# Patient Record
Sex: MALE | Race: BLACK OR AFRICAN AMERICAN | Employment: FULL TIME | URBAN - METROPOLITAN AREA
[De-identification: names, ages, dates, MRNs, and addresses within clinical notes are randomized per-mention and may not be internally consistent; named-entity substitution may affect disease eponyms.]

---

## 2023-02-01 ENCOUNTER — HOSPITAL ENCOUNTER (EMERGENCY)
Age: 58
Discharge: HOME | End: 2023-02-01
Attending: EMERGENCY MEDICINE
Payer: COMMERCIAL

## 2023-02-01 VITALS
HEART RATE: 80 BPM | WEIGHT: 188 LBS | OXYGEN SATURATION: 96 % | RESPIRATION RATE: 16 BRPM | HEIGHT: 73 IN | BODY MASS INDEX: 24.92 KG/M2 | SYSTOLIC BLOOD PRESSURE: 128 MMHG | TEMPERATURE: 97.5 F | DIASTOLIC BLOOD PRESSURE: 83 MMHG

## 2023-02-01 DIAGNOSIS — G47.00 INSOMNIA, UNSPECIFIED TYPE: Primary | ICD-10-CM

## 2023-02-01 LAB
A/G RATIO: 1.6 (ref 1.1–2.2)
ALBUMIN SERPL-MCNC: 4.2 G/DL (ref 3.4–5)
ALP BLD-CCNC: 63 U/L (ref 40–129)
ALT SERPL-CCNC: 20 U/L (ref 10–40)
ANION GAP SERPL CALCULATED.3IONS-SCNC: 10 MMOL/L (ref 3–16)
AST SERPL-CCNC: 18 U/L (ref 15–37)
BASOPHILS ABSOLUTE: 0 K/UL (ref 0–0.2)
BASOPHILS RELATIVE PERCENT: 0.7 %
BILIRUB SERPL-MCNC: 1.2 MG/DL (ref 0–1)
BUN BLDV-MCNC: 11 MG/DL (ref 7–20)
CALCIUM SERPL-MCNC: 9.2 MG/DL (ref 8.3–10.6)
CHLORIDE BLD-SCNC: 101 MMOL/L (ref 99–110)
CO2: 26 MMOL/L (ref 21–32)
CREAT SERPL-MCNC: 0.9 MG/DL (ref 0.9–1.3)
EKG ATRIAL RATE: 71 BPM
EKG DIAGNOSIS: NORMAL
EKG P AXIS: 37 DEGREES
EKG P-R INTERVAL: 182 MS
EKG Q-T INTERVAL: 362 MS
EKG QRS DURATION: 86 MS
EKG QTC CALCULATION (BAZETT): 393 MS
EKG R AXIS: 2 DEGREES
EKG T AXIS: 17 DEGREES
EKG VENTRICULAR RATE: 71 BPM
EOSINOPHILS ABSOLUTE: 0.1 K/UL (ref 0–0.6)
EOSINOPHILS RELATIVE PERCENT: 0.9 %
GFR SERPL CREATININE-BSD FRML MDRD: >60 ML/MIN/{1.73_M2}
GLUCOSE BLD-MCNC: 211 MG/DL (ref 70–99)
HCT VFR BLD CALC: 35.7 % (ref 40.5–52.5)
HEMOGLOBIN: 12 G/DL (ref 13.5–17.5)
LYMPHOCYTES ABSOLUTE: 1.3 K/UL (ref 1–5.1)
LYMPHOCYTES RELATIVE PERCENT: 21.3 %
MCH RBC QN AUTO: 27.7 PG (ref 26–34)
MCHC RBC AUTO-ENTMCNC: 33.5 G/DL (ref 31–36)
MCV RBC AUTO: 82.8 FL (ref 80–100)
MONOCYTES ABSOLUTE: 0.3 K/UL (ref 0–1.3)
MONOCYTES RELATIVE PERCENT: 5.6 %
NEUTROPHILS ABSOLUTE: 4.2 K/UL (ref 1.7–7.7)
NEUTROPHILS RELATIVE PERCENT: 71.5 %
PDW BLD-RTO: 13.4 % (ref 12.4–15.4)
PLATELET # BLD: 215 K/UL (ref 135–450)
PMV BLD AUTO: 7.9 FL (ref 5–10.5)
POTASSIUM REFLEX MAGNESIUM: 3.9 MMOL/L (ref 3.5–5.1)
RBC # BLD: 4.31 M/UL (ref 4.2–5.9)
SODIUM BLD-SCNC: 137 MMOL/L (ref 136–145)
TOTAL PROTEIN: 6.8 G/DL (ref 6.4–8.2)
TSH REFLEX: 0.72 UIU/ML (ref 0.27–4.2)
VITAMIN D 25-HYDROXY: 31.3 NG/ML
WBC # BLD: 5.9 K/UL (ref 4–11)

## 2023-02-01 PROCEDURE — 84443 ASSAY THYROID STIM HORMONE: CPT

## 2023-02-01 PROCEDURE — 80053 COMPREHEN METABOLIC PANEL: CPT

## 2023-02-01 PROCEDURE — 93005 ELECTROCARDIOGRAM TRACING: CPT | Performed by: EMERGENCY MEDICINE

## 2023-02-01 PROCEDURE — 85025 COMPLETE CBC W/AUTO DIFF WBC: CPT

## 2023-02-01 PROCEDURE — 99284 EMERGENCY DEPT VISIT MOD MDM: CPT

## 2023-02-01 PROCEDURE — 82306 VITAMIN D 25 HYDROXY: CPT

## 2023-02-01 RX ORDER — DORZOLAMIDE HCL 20 MG/ML
2 SOLUTION/ DROPS OPHTHALMIC 3 TIMES DAILY
COMMUNITY

## 2023-02-01 RX ORDER — TIMOLOL MALEATE 5 MG/ML
1 SOLUTION/ DROPS OPHTHALMIC 2 TIMES DAILY
COMMUNITY

## 2023-02-01 RX ORDER — HYDROXYZINE HYDROCHLORIDE 25 MG/1
25 TABLET, FILM COATED ORAL
Qty: 20 TABLET | Refills: 0 | Status: SHIPPED | OUTPATIENT
Start: 2023-02-01

## 2023-02-01 RX ORDER — BRIMONIDINE TARTRATE 2 MG/ML
1 SOLUTION/ DROPS OPHTHALMIC 3 TIMES DAILY
COMMUNITY

## 2023-02-01 RX ORDER — LISINOPRIL 10 MG/1
10 TABLET ORAL DAILY
COMMUNITY

## 2023-02-01 RX ORDER — AMLODIPINE BESYLATE 10 MG/1
10 TABLET ORAL DAILY
COMMUNITY

## 2023-02-01 RX ORDER — DOXAZOSIN 2 MG/1
2 TABLET ORAL NIGHTLY
COMMUNITY

## 2023-02-01 ASSESSMENT — ENCOUNTER SYMPTOMS
NAUSEA: 0
SHORTNESS OF BREATH: 0
ABDOMINAL PAIN: 0
DIARRHEA: 0
VOMITING: 0
COUGH: 0

## 2023-02-01 ASSESSMENT — LIFESTYLE VARIABLES
HOW MANY STANDARD DRINKS CONTAINING ALCOHOL DO YOU HAVE ON A TYPICAL DAY: PATIENT DOES NOT DRINK
HOW OFTEN DO YOU HAVE A DRINK CONTAINING ALCOHOL: NEVER

## 2023-02-01 NOTE — ED PROVIDER NOTES
[address] [facility]          ATTENDING PHYSICIAN NOTE       Date of evaluation: 2/1/2023    Chief Complaint     Insomnia (Pt is stating he has not been able to sleep well for 2 weeks now. Denies any new stressors or recent new medications.)      History of Present Illness     Rosa Zuniga is a 59 y.o. male who presents with a 2-week history of insomnia. The patient has tried multiple different over-the-counter medications including melatonin but has been able to get no more than 1 to 2 hours of sleep per night since symptoms began. He does report that he worries about things when he wakes up that there could be something more serious wrong with him. He has never had insomnia before. He denies any physical pain. About 3 months ago, he was diagnosed with hypertension after never having been on medications and had not been to a doctor in many years. He currently does not have a primary care physician. The patient also was recently diagnosed with glaucoma and started on 3 eyedrops. This was done a couple of weeks before he began experiencing the insomnia. The patient states that his glaucoma symptoms are improving. The patient does feel his mind racing and feels anxiety about what could be going on and this may compound things. He tries to listen to music to try to go to sleep. He does not report symptoms consistent with sleep apnea. Overall however he does feel some generalized fatigue. Review of Systems     Review of Systems   Constitutional:  Positive for appetite change and fatigue. Negative for chills and fever. Eyes:  Positive for visual disturbance (from glaucoma). Respiratory:  Negative for cough and shortness of breath. Cardiovascular:  Negative for chest pain. Gastrointestinal:  Negative for abdominal pain, diarrhea, nausea and vomiting. Neurological:  Negative for headaches. All other systems reviewed and are negative.     Past Medical, Surgical, Family, and Social History     He has a past medical history of Glaucoma and Hypertension. He has no past surgical history on file. His family history is not on file. He reports that he does not currently use alcohol. He reports that he does not currently use drugs. Medications     Discharge Medication List as of 2/1/2023  9:33 AM        CONTINUE these medications which have NOT CHANGED    Details   lisinopril (PRINIVIL;ZESTRIL) 10 MG tablet Take 10 mg by mouth dailyHistorical Med      amLODIPine (NORVASC) 10 MG tablet Take 10 mg by mouth dailyHistorical Med      doxazosin (CARDURA) 2 MG tablet Take 2 mg by mouth nightlyHistorical Med      timolol (TIMOPTIC) 0.5 % ophthalmic solution Place 1 drop into both eyes 2 times dailyHistorical Med      brimonidine (ALPHAGAN) 0.2 % ophthalmic solution Place 1 drop into the left eye 3 times dailyHistorical Med      dorzolamide (TRUSOPT) 2 % ophthalmic solution Place 2 drops into the left eye 3 times dailyHistorical Med             Allergies     He has No Known Allergies. Physical Exam     INITIAL VITALS: BP: (!) 176/103, Temp: 98.7 °F (37.1 °C), Heart Rate: 78, Resp: 18, SpO2: 99 %   Physical Exam  Vitals and nursing note reviewed. Constitutional:       General: He is not in acute distress. Appearance: He is well-developed. He is not diaphoretic. Eyes:      Extraocular Movements: Extraocular movements intact. Conjunctiva/sclera:      Left eye: Left conjunctiva is injected. Pupils: Pupils are equal, round, and reactive to light. Cardiovascular:      Rate and Rhythm: Normal rate and regular rhythm. Pulmonary:      Effort: Pulmonary effort is normal.      Breath sounds: Normal breath sounds. Abdominal:      General: Bowel sounds are normal. There is no distension. Palpations: Abdomen is soft. Tenderness: There is no abdominal tenderness. Skin:     General: Skin is warm and dry.    Neurological:      Mental Status: He is alert and oriented to person, place, and time.   Psychiatric:         Behavior: Behavior normal.       Diagnostic Results     EKG   Interpreted by No att. providers found     Rhythm: normal sinus   Rate: normal  Axis: normal  Ectopy: none  Conduction: normal  ST Segments: no acute change  T Waves: no acute change  Q Waves: none    Clinical Impression: normal sinus rhythm with LVH      RADIOLOGY:  No orders to display       LABS:   Results for orders placed or performed during the hospital encounter of 02/01/23   CMP w/ Reflex to MG   Result Value Ref Range    Sodium 137 136 - 145 mmol/L    Potassium reflex Magnesium 3.9 3.5 - 5.1 mmol/L    Chloride 101 99 - 110 mmol/L    CO2 26 21 - 32 mmol/L    Anion Gap 10 3 - 16    Glucose 211 (H) 70 - 99 mg/dL    BUN 11 7 - 20 mg/dL    Creatinine 0.9 0.9 - 1.3 mg/dL    Est, Glom Filt Rate >60 >60    Calcium 9.2 8.3 - 10.6 mg/dL    Total Protein 6.8 6.4 - 8.2 g/dL    Albumin 4.2 3.4 - 5.0 g/dL    Albumin/Globulin Ratio 1.6 1.1 - 2.2    Total Bilirubin 1.2 (H) 0.0 - 1.0 mg/dL    Alkaline Phosphatase 63 40 - 129 U/L    ALT 20 10 - 40 U/L    AST 18 15 - 37 U/L   CBC with Auto Differential   Result Value Ref Range    WBC 5.9 4.0 - 11.0 K/uL    RBC 4.31 4.20 - 5.90 M/uL    Hemoglobin 12.0 (L) 13.5 - 17.5 g/dL    Hematocrit 35.7 (L) 40.5 - 52.5 %    MCV 82.8 80.0 - 100.0 fL    MCH 27.7 26.0 - 34.0 pg    MCHC 33.5 31.0 - 36.0 g/dL    RDW 13.4 12.4 - 15.4 %    Platelets 215 135 - 450 K/uL    MPV 7.9 5.0 - 10.5 fL    Neutrophils % 71.5 %    Lymphocytes % 21.3 %    Monocytes % 5.6 %    Eosinophils % 0.9 %    Basophils % 0.7 %    Neutrophils Absolute 4.2 1.7 - 7.7 K/uL    Lymphocytes Absolute 1.3 1.0 - 5.1 K/uL    Monocytes Absolute 0.3 0.0 - 1.3 K/uL    Eosinophils Absolute 0.1 0.0 - 0.6 K/uL    Basophils Absolute 0.0 0.0 - 0.2 K/uL   EKG 12 Lead   Result Value Ref Range    Ventricular Rate 71 BPM    Atrial Rate 71 BPM    P-R Interval 182 ms    QRS Duration 86 ms    Q-T Interval 362 ms    QTc  Calculation (Bazetarun) 393 ms    P Axis 37 degrees    R Axis 2 degrees    T Axis 17 degrees    Diagnosis       EKG performed in ER and to be interpreted by ER physician. Confirmed by MD, ER ([id number]),  [address], [address] ([id number]) on 2/1/2023 8:23:01 AM       ED BEDSIDE ULTRASOUND:  No results found. RECENT VITALS:  BP: 128/83,Temp: 97.5 °F (36.4 °C), Heart Rate: 80, Resp: 16, SpO2: 96 %     ED Course     Nursing Notes, Past Medical Hx, Past Surgical Hx, Social Hx,Allergies, and Family Hx were reviewed. patient was given the following medications:  Orders Placed This Encounter   Medications    hydrOXYzine HCl (ATARAX) 25 MG tablet     Sig: Take 1 tablet by mouth nightly as needed (insomnia)     Dispense:  20 tablet     Refill:  0         CONSULTS:  None    MEDICAL DECISIONMAKING / ASSESSMENT / Radha Gonzalez is a 59 y.o. male presenting with insomnia of 2 weeks duration spite taking several over-the-counter medications and not having them work. His medications were reviewed and of note was that the doxazosin is recommended to be taken nightly but he is taking it in the morning. Given that this patient has hypertension and has not been to a doctor, I did offer some baseline labs that could be followed up by primary care as well and we will plug him into our resident follow-up clinic. Screening labs here were unremarkable. EKG shows signs of LVH but no other concerning findings and patient was felt to be stable for discharge home to follow-up in the medicine clinic since he has no established primary care. I will trial him on hydroxyzine as needed for sleep at night and advised that he set up primary care given his history of hypertension and now glaucoma. He is agreeable to this plan. Information on insomnia was provided to the patient. Clinical Impression     1.  Insomnia, unspecified type        Disposition     PATIENT REFERRED TO:  Jesus [address]  Anjum [address] Re[address] Ozuna [address] [place] [id number]  [phone]    Call   to schedule new primary care followup    DISCHARGE MEDICATIONS:  Discharge Medication List as of 2/1/2023  9:33 AM        START taking these medications    Details   hydrOXYzine HCl (ATARAX) 25 MG tablet Take 1 tablet by mouth nightly as needed (insomnia), Disp-20 tablet, R-0Print             DISPOSITION Decision To Discharge 02/01/2023 09:24:32 AM         Jordi Main MD  02/01/23 1024

## 2023-02-07 ENCOUNTER — OFFICE VISIT (OUTPATIENT)
Dept: INTERNAL MEDICINE CLINIC | Age: 58
End: 2023-02-07
Payer: COMMERCIAL

## 2023-02-07 VITALS
WEIGHT: 194.1 LBS | RESPIRATION RATE: 16 BRPM | TEMPERATURE: 98.8 F | HEART RATE: 78 BPM | DIASTOLIC BLOOD PRESSURE: 78 MMHG | BODY MASS INDEX: 25.61 KG/M2 | OXYGEN SATURATION: 98 % | SYSTOLIC BLOOD PRESSURE: 115 MMHG

## 2023-02-07 DIAGNOSIS — H40.9 GLAUCOMA OF LEFT EYE, UNSPECIFIED GLAUCOMA TYPE: ICD-10-CM

## 2023-02-07 DIAGNOSIS — R73.9 HYPERGLYCEMIA: Primary | ICD-10-CM

## 2023-02-07 DIAGNOSIS — I10 ESSENTIAL HYPERTENSION: ICD-10-CM

## 2023-02-07 LAB — HBA1C MFR BLD: 5.6 %

## 2023-02-07 PROCEDURE — 99213 OFFICE O/P EST LOW 20 MIN: CPT | Performed by: STUDENT IN AN ORGANIZED HEALTH CARE EDUCATION/TRAINING PROGRAM

## 2023-02-07 PROCEDURE — 83036 HEMOGLOBIN GLYCOSYLATED A1C: CPT | Performed by: STUDENT IN AN ORGANIZED HEALTH CARE EDUCATION/TRAINING PROGRAM

## 2023-02-07 ASSESSMENT — ENCOUNTER SYMPTOMS
PHOTOPHOBIA: 0
NAUSEA: 0
DIARRHEA: 0
VOMITING: 0
SHORTNESS OF BREATH: 0
WHEEZING: 0
BACK PAIN: 0
ABDOMINAL PAIN: 0
CONSTIPATION: 0
CHOKING: 0
COUGH: 0

## 2023-02-07 NOTE — PATIENT INSTRUCTIONS
Please come back in 3 months. Please check your blood pressure at home and bring the blood pressure log to our next appointment.

## 2023-04-28 SDOH — ECONOMIC STABILITY: INCOME INSECURITY: HOW HARD IS IT FOR YOU TO PAY FOR THE VERY BASICS LIKE FOOD, HOUSING, MEDICAL CARE, AND HEATING?: HARD

## 2023-04-28 SDOH — ECONOMIC STABILITY: TRANSPORTATION INSECURITY
IN THE PAST 12 MONTHS, HAS LACK OF TRANSPORTATION KEPT YOU FROM MEETINGS, WORK, OR FROM GETTING THINGS NEEDED FOR DAILY LIVING?: NO

## 2023-04-28 SDOH — ECONOMIC STABILITY: FOOD INSECURITY: WITHIN THE PAST 12 MONTHS, THE FOOD YOU BOUGHT JUST DIDN'T LAST AND YOU DIDN'T HAVE MONEY TO GET MORE.: OFTEN TRUE

## 2023-04-28 SDOH — ECONOMIC STABILITY: HOUSING INSECURITY
IN THE LAST 12 MONTHS, WAS THERE A TIME WHEN YOU DID NOT HAVE A STEADY PLACE TO SLEEP OR SLEPT IN A SHELTER (INCLUDING NOW)?: NO

## 2023-04-28 SDOH — ECONOMIC STABILITY: FOOD INSECURITY: WITHIN THE PAST 12 MONTHS, YOU WORRIED THAT YOUR FOOD WOULD RUN OUT BEFORE YOU GOT MONEY TO BUY MORE.: SOMETIMES TRUE

## 2023-05-01 ENCOUNTER — OFFICE VISIT (OUTPATIENT)
Dept: INTERNAL MEDICINE CLINIC | Age: 58
End: 2023-05-01
Payer: COMMERCIAL

## 2023-05-01 VITALS
SYSTOLIC BLOOD PRESSURE: 153 MMHG | TEMPERATURE: 97.3 F | BODY MASS INDEX: 24.04 KG/M2 | OXYGEN SATURATION: 97 % | WEIGHT: 181.4 LBS | DIASTOLIC BLOOD PRESSURE: 89 MMHG | HEIGHT: 73 IN | RESPIRATION RATE: 20 BRPM | HEART RATE: 96 BPM

## 2023-05-01 DIAGNOSIS — L97.419 ULCER OF RIGHT HEEL AND MIDFOOT, UNSPECIFIED ULCER STAGE (HCC): Primary | ICD-10-CM

## 2023-05-01 DIAGNOSIS — I10 PRIMARY HYPERTENSION: ICD-10-CM

## 2023-05-01 PROCEDURE — 99213 OFFICE O/P EST LOW 20 MIN: CPT

## 2023-05-01 NOTE — PATIENT INSTRUCTIONS
- Start applying antibiotic cream over lesion.  - Please see Podiatry for visit at given appointment time. - Continue current meds. - Return to clinic in 2 weeks for BP follow up. GO TO EMERGENCY NOW TO SEE PODIATRY RESIDENTS  Patient did not want to use ED due to large amt out of pocket expense.    Zay Waite will see patient tomorrow in his office in [facility] DR FIORELLA PEDRAZA at [address] [place] [address] and [address]@ 11:00am

## 2023-05-01 NOTE — ASSESSMENT & PLAN NOTE
High today however component of pain with skin lesion. Recheck at next visit. Continue current medications.

## 2023-05-01 NOTE — PROGRESS NOTES
[facility]    Carlton Levin is a 59 y.o. male, here for evaluation of the following concerns:    Right Ankle Heel Swelling/Drainage  Patient states painful swelling and discharge of back of R heel for the past 2-3 weeks. He states that it is draining pus from multiple small wounds. He is not sure what is causing it, but states he has had it happen a few years ago. He did not use medications for it at that time, it healed without intervention in a few months. With this current episode, he endorses it is far worse. He endorses associated feeling febrile for the past few days, off and on, never measured with a thermometer. He denies chills. He denies any other signs of infection including cough, cold, congestion. Of note had recent EGD and colonoscopy. HTN  Has hx of HTN. BP elevated this visit 153/89. Takes 3 meds - Lisinopril 10 mg daily, Amlodipine 10 mg daily and Cardura 2 mg nightly. Review of Systems  Per HPI. MEDICATIONS:  Prior to Visit Medications    Medication Sig Taking? Authorizing Provider   linaCLOtide (LINZESS PO) Take by mouth Yes Historical Provider, MD   mupirocin (BACTROBAN) 2 % ointment Apply topically 3 times daily.  Yes Yrn Calderon MD   lisinopril (PRINIVIL;ZESTRIL) 10 MG tablet Take 1 tablet by mouth daily Yes Historical Provider, MD   amLODIPine (NORVASC) 10 MG tablet Take 1 tablet by mouth daily Yes Historical Provider, MD   doxazosin (CARDURA) 2 MG tablet Take 1 tablet by mouth nightly Yes Historical Provider, MD   timolol (TIMOPTIC) 0.5 % ophthalmic solution Place 1 drop into both eyes 2 times daily Yes Historical Provider, MD   brimonidine (ALPHAGAN) 0.2 % ophthalmic solution Place 1 drop into the left eye 3 times daily Yes Historical Provider, MD   dorzolamide (TRUSOPT) 2 % ophthalmic solution Place 2 drops into the left eye 3 times daily Yes Historical Provider, MD        Vitals:    05/01/23 1432   BP: (!) 153/89   Site:

## 2023-05-01 NOTE — ASSESSMENT & PLAN NOTE
Patient p/w blisters/ulceration of R heel with drainage and pus. I am unsure the etiology of this. 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 I do believe we should biopsy so we are able to know what is going on here and treat appropriately. In meantime, given Mupirocin ointment and instructed to use on skin TID. Plan for appointment w/ Dr. Mia Tsang tomorrow (5/2) at 11 a.m. (Patient refused ED visit d/t insurance issues).

## 2023-05-31 ENCOUNTER — OFFICE VISIT (OUTPATIENT)
Dept: INTERNAL MEDICINE CLINIC | Age: 58
End: 2023-05-31
Payer: COMMERCIAL

## 2023-05-31 VITALS
RESPIRATION RATE: 20 BRPM | OXYGEN SATURATION: 99 % | TEMPERATURE: 98.1 F | DIASTOLIC BLOOD PRESSURE: 87 MMHG | HEART RATE: 86 BPM | BODY MASS INDEX: 24.36 KG/M2 | SYSTOLIC BLOOD PRESSURE: 169 MMHG | HEIGHT: 73 IN | WEIGHT: 183.8 LBS

## 2023-05-31 DIAGNOSIS — Z12.5 PROSTATE CANCER SCREENING: ICD-10-CM

## 2023-05-31 DIAGNOSIS — L97.411 ULCER OF RIGHT HEEL AND MIDFOOT, LIMITED TO BREAKDOWN OF SKIN (HCC): ICD-10-CM

## 2023-05-31 DIAGNOSIS — D64.9 ANEMIA, UNSPECIFIED TYPE: Primary | ICD-10-CM

## 2023-05-31 DIAGNOSIS — I10 PRIMARY HYPERTENSION: ICD-10-CM

## 2023-05-31 PROCEDURE — 99213 OFFICE O/P EST LOW 20 MIN: CPT | Performed by: STUDENT IN AN ORGANIZED HEALTH CARE EDUCATION/TRAINING PROGRAM

## 2023-05-31 RX ORDER — AMLODIPINE AND OLMESARTAN MEDOXOMIL 10; 40 MG/1; MG/1
1 TABLET ORAL DAILY
Qty: 30 TABLET | Refills: 0 | Status: SHIPPED | OUTPATIENT
Start: 2023-05-31

## 2023-05-31 NOTE — ASSESSMENT & PLAN NOTE
Patient has symptoms of fatigue and weakness  Hgb 12.0 on 4/2023  OP GI colonoscopy negative for any findings  Full anemic work up

## 2023-05-31 NOTE — PATIENT INSTRUCTIONS
Today we discussed the following:    Leg wound - please follow up with Dr. Alicia Lozano for further recommendations  Anemia - please obtain labs for further anemic work up  Hypertension - please stop taking amlodipine and lisinopril and start Angela 10-40 mg capsule daily  Please keep a blood pressure log to check in the morning, afternoon and evening and bring in next visit  Basic labs - please get cholesterol and PSA labs done    It was a pleasure meeting you both, and I will see you in 2 weeks!

## 2023-05-31 NOTE — ASSESSMENT & PLAN NOTE
Symptoms improving with 7 day course of Bactrim  F/u with Dr. Radha Parr in 1 week for biopsy results

## 2023-05-31 NOTE — ASSESSMENT & PLAN NOTE
Uncontrolled with /78  Stop amlodipine and lisinopril  Start Angela 10-40 mg daily  Counseled patient to keep blood pressure log  Reassess in 2 weeks

## 2023-05-31 NOTE — PROGRESS NOTES
[facility]    Melanie Clarke is a 59 y.o. male, here for evaluation of the following concerns:    Mr. Melanie Clarke is a 39-year-old male with a history of hypertension and anemia presents to the clinic for a follow up on his right foot wound. Patient was referred to podiatry and was started on Bactrim and had moderate improvement. Patient states he still has feelings of fatigue and was found to be anemic on his prior ED visit. Patient underwent colonoscopy and was negative for any findings. Patient states he wants to know why he is anemic. He denies any hematochezia, hematuria, hematemesis, nausea, vomiting, chest pain, shortness of breath, fever, chills, cough, diarrhea or constipation. Fatigue  This is a recurrent problem. The current episode started more than 1 month ago. The problem occurs daily. The problem has been unchanged. Associated symptoms include fatigue. Nothing aggravates the symptoms. The treatment provided no relief. Review of Systems   Constitutional:  Positive for fatigue. MEDICATIONS:  Prior to Visit Medications    Medication Sig Taking?  Authorizing Provider   linaclotide (LINZESS) 145 MCG capsule Take 1 capsule by mouth every morning (before breakfast) Yes Pia Zhou MD   amLODIPine-olmesartan (SHARI) 10-40 MG per tablet Take 1 tablet by mouth daily Yes Pia Zhou MD   doxazosin (CARDURA) 2 MG tablet Take 1 tablet by mouth nightly Yes Historical Provider, MD   timolol (TIMOPTIC) 0.5 % ophthalmic solution Place 1 drop into both eyes 2 times daily Yes Historical Provider, MD   brimonidine (ALPHAGAN) 0.2 % ophthalmic solution Place 1 drop into the left eye 3 times daily Yes Historical Provider, MD   dorzolamide (TRUSOPT) 2 % ophthalmic solution Place 2 drops into the left eye 3 times daily Yes Historical Provider, MD        Vitals:    05/31/23 0917 05/31/23 0922   BP: (!) 152/89 (!) 169/87   Site: Left Upper Arm Right Upper Arm

## 2023-06-02 DIAGNOSIS — Z12.5 PROSTATE CANCER SCREENING: ICD-10-CM

## 2023-06-02 DIAGNOSIS — D64.9 ANEMIA, UNSPECIFIED TYPE: ICD-10-CM

## 2023-06-02 DIAGNOSIS — I10 PRIMARY HYPERTENSION: ICD-10-CM

## 2023-06-02 LAB
BASOPHILS # BLD: 0 K/UL (ref 0–0.2)
BASOPHILS NFR BLD: 0.7 %
CHOLEST SERPL-MCNC: 201 MG/DL (ref 0–199)
CRP SERPL-MCNC: <3 MG/L (ref 0–5.1)
DEPRECATED RDW RBC AUTO: 13.8 % (ref 12.4–15.4)
EOSINOPHIL # BLD: 0.1 K/UL (ref 0–0.6)
EOSINOPHIL NFR BLD: 2.2 %
ERYTHROCYTE [SEDIMENTATION RATE] IN BLOOD BY WESTERGREN METHOD: 34 MM/HR (ref 0–20)
FERRITIN SERPL IA-MCNC: 356.2 NG/ML (ref 30–400)
FOLATE SERPL-MCNC: >20 NG/ML (ref 4.78–24.2)
HCT VFR BLD AUTO: 37.9 % (ref 40.5–52.5)
HDLC SERPL-MCNC: 61 MG/DL (ref 40–60)
HGB BLD-MCNC: 12.4 G/DL (ref 13.5–17.5)
IMMATURE RETIC FRACT: 0.59 (ref 0.21–0.37)
IRON SATN MFR SERPL: 31 % (ref 20–50)
IRON SERPL-MCNC: 72 UG/DL (ref 59–158)
LDL CHOLESTEROL CALCULATED: 120 MG/DL
LYMPHOCYTES # BLD: 1.7 K/UL (ref 1–5.1)
LYMPHOCYTES NFR BLD: 29.9 %
MCH RBC QN AUTO: 28.4 PG (ref 26–34)
MCHC RBC AUTO-ENTMCNC: 32.7 G/DL (ref 31–36)
MCV RBC AUTO: 86.8 FL (ref 80–100)
MONOCYTES # BLD: 0.4 K/UL (ref 0–1.3)
MONOCYTES NFR BLD: 7.4 %
NEUTROPHILS # BLD: 3.4 K/UL (ref 1.7–7.7)
NEUTROPHILS NFR BLD: 59.8 %
PLATELET # BLD AUTO: 191 K/UL (ref 135–450)
PMV BLD AUTO: 8.3 FL (ref 5–10.5)
PSA SERPL DL<=0.01 NG/ML-MCNC: 0.84 NG/ML (ref 0–4)
RBC # BLD AUTO: 4.37 M/UL (ref 4.2–5.9)
RETICS # AUTO: 0.11 M/UL
RETICS/RBC NFR AUTO: 2.52 % (ref 0.5–2.18)
TIBC SERPL-MCNC: 235 UG/DL (ref 260–445)
TRIGL SERPL-MCNC: 99 MG/DL (ref 0–150)
VIT B12 SERPL-MCNC: 794 PG/ML (ref 211–911)
VLDLC SERPL CALC-MCNC: 20 MG/DL
WBC # BLD AUTO: 5.6 K/UL (ref 4–11)

## 2023-06-14 PROBLEM — D57.3 SICKLE CELL TRAIT (HCC): Status: ACTIVE | Noted: 2023-05-31

## 2023-06-14 PROBLEM — E78.00 HYPERCHOLESTEREMIA: Status: ACTIVE | Noted: 2023-06-14

## 2023-06-27 DIAGNOSIS — I10 ESSENTIAL HYPERTENSION: ICD-10-CM

## 2023-06-27 RX ORDER — AMLODIPINE AND OLMESARTAN MEDOXOMIL 10; 40 MG/1; MG/1
TABLET ORAL
Qty: 90 TABLET | Refills: 1 | OUTPATIENT
Start: 2023-06-27

## 2023-06-27 NOTE — TELEPHONE ENCOUNTER
Requested Prescriptions     Pending Prescriptions Disp Refills    amLODIPine-olmesartan (SHARI) 10-40 MG per tablet [Pharmacy Med Name: AMLODIPINE-OLMESARTAN 10-40 MG] 90 tablet 1     Sig: TAKE 1 TABLET BY [address] [place] [address] Visit:  6/14/2023     Next Clinic Appointment:  7/3/2023

## 2023-07-18 ENCOUNTER — OFFICE VISIT (OUTPATIENT)
Dept: INTERNAL MEDICINE CLINIC | Age: 58
End: 2023-07-18
Payer: COMMERCIAL

## 2023-07-18 VITALS
SYSTOLIC BLOOD PRESSURE: 124 MMHG | TEMPERATURE: 98.3 F | RESPIRATION RATE: 18 BRPM | DIASTOLIC BLOOD PRESSURE: 74 MMHG | WEIGHT: 179.7 LBS | HEART RATE: 82 BPM | OXYGEN SATURATION: 98 % | HEIGHT: 73 IN | BODY MASS INDEX: 23.82 KG/M2

## 2023-07-18 DIAGNOSIS — H40.9 GLAUCOMA OF LEFT EYE, UNSPECIFIED GLAUCOMA TYPE: ICD-10-CM

## 2023-07-18 DIAGNOSIS — D57.3 SICKLE CELL TRAIT (HCC): ICD-10-CM

## 2023-07-18 DIAGNOSIS — I10 ESSENTIAL HYPERTENSION: ICD-10-CM

## 2023-07-18 DIAGNOSIS — Z01.818 PRE-OP EXAMINATION: Primary | ICD-10-CM

## 2023-07-18 DIAGNOSIS — R53.83 OTHER FATIGUE: ICD-10-CM

## 2023-07-18 PROCEDURE — 99213 OFFICE O/P EST LOW 20 MIN: CPT

## 2023-07-18 NOTE — PROGRESS NOTES
Pre-Op Examination    :  Jung Church                                               : 1965  Age: 59 y.o. MRN: [id number]  Date : 2023    Referring Physician: Dr. Sol Mahmood    Procedure: Dary Chavarria (minimally invasive glaucoma surgery)    HISTORY OF PRESENT ILLNESS:   The patient is a 59 y.o. male who presents for preoperative examination. Planned anesthesia: local  Known anesthesia problems: none  Bleeding risk:  no increased risk  Personal or FH of DVT/PE:  no  Patient objection to receiving blood products: no objection      Past Medical History:        Diagnosis Date    Glaucoma     Hypertension        Past Surgical History:    No past surgical history on file. Family History:   No family history on file. Social History:   TOBACCO:   reports that he has never smoked. He has never used smokeless tobacco.  ETOH:   reports that he does not currently use alcohol. OCCUPATION: works in [employer]:  Patient has no known allergies. Current Medications:    Prior to Admission medications    Medication Sig Start Date End Date Taking?  Authorizing Provider   amLODIPine-olmesartan (SHARI) 10-40 MG per tablet Take 1 tablet by mouth daily 23  Yes Neeta Golden MD   doxazosin (CARDURA) 2 MG tablet Take 1 tablet by mouth nightly 23  Yes Neeta Golden MD   rosuvastatin (CRESTOR) 20 MG tablet Take 1 tablet by mouth daily 23  Yes Neeta Golden MD   linaclotide [facility]) 145 MCG capsule Take 1 capsule by mouth every morning (before breakfast) 23  Yes Neeta Golden MD   timolol (TIMOPTIC) 0.5 % ophthalmic solution Place 1 drop into both eyes 2 times daily   Yes Historical Provider, MD   brimonidine (ALPHAGAN) 0.2 % ophthalmic solution Place 1 drop into the left eye 3 times daily   Yes Historical Provider, MD   dorzolamide (TRUSOPT) 2 % ophthalmic solution Place 2 drops into the left eye 3 times daily   Yes Historical Provider, MD       REVIEW OF SYSTEMS:  Review of Systems   Constitutional:

## 2023-07-18 NOTE — PATIENT INSTRUCTIONS
Getting your sleep pattern back to 7-8 hours per night will be of most benefit to your fatigue. Stop taking the doxazosin to see if it will help your fatigue. You can add a B complex vitamin to your diet daily. You can also start taking a supplement with alpha lipoic acid and acetyl l-carnitine to see if it will help improve your energy. I encourage you to eat a more diverse range of fruit and to add some vegetables to your diet. The extra fibre in the vegetables will also help improve any problems with constipation.

## 2023-09-29 ENCOUNTER — OFFICE VISIT (OUTPATIENT)
Dept: INTERNAL MEDICINE CLINIC | Age: 58
End: 2023-09-29
Payer: COMMERCIAL

## 2023-09-29 VITALS
SYSTOLIC BLOOD PRESSURE: 153 MMHG | OXYGEN SATURATION: 100 % | WEIGHT: 177.6 LBS | TEMPERATURE: 98.1 F | HEART RATE: 76 BPM | HEIGHT: 73 IN | BODY MASS INDEX: 23.54 KG/M2 | RESPIRATION RATE: 20 BRPM | DIASTOLIC BLOOD PRESSURE: 87 MMHG

## 2023-09-29 DIAGNOSIS — H40.9 GLAUCOMA OF LEFT EYE, UNSPECIFIED GLAUCOMA TYPE: Primary | ICD-10-CM

## 2023-09-29 DIAGNOSIS — I10 ESSENTIAL HYPERTENSION: ICD-10-CM

## 2023-09-29 PROCEDURE — 99213 OFFICE O/P EST LOW 20 MIN: CPT | Performed by: STUDENT IN AN ORGANIZED HEALTH CARE EDUCATION/TRAINING PROGRAM

## 2023-09-29 RX ORDER — AMLODIPINE AND OLMESARTAN MEDOXOMIL 10; 40 MG/1; MG/1
1 TABLET ORAL DAILY
Qty: 30 TABLET | Refills: 3 | Status: SHIPPED | OUTPATIENT
Start: 2023-09-29

## 2023-09-29 RX ORDER — TIMOLOL MALEATE 5 MG/ML
1 SOLUTION/ DROPS OPHTHALMIC 2 TIMES DAILY
Qty: 1 EACH | Refills: 3 | Status: SHIPPED | OUTPATIENT
Start: 2023-09-29

## 2023-09-29 RX ORDER — DORZOLAMIDE HCL 20 MG/ML
2 SOLUTION/ DROPS OPHTHALMIC 3 TIMES DAILY
Qty: 10 ML | Refills: 3 | Status: SHIPPED | OUTPATIENT
Start: 2023-09-29 | End: 2023-10-29

## 2023-09-29 ASSESSMENT — ENCOUNTER SYMPTOMS
CHEST TIGHTNESS: 0
NAUSEA: 0
EYE PAIN: 1
VOMITING: 0
COUGH: 0
CONSTIPATION: 0
DIARRHEA: 0

## 2023-09-29 NOTE — PROGRESS NOTES
R-3Normal  -     dorzolamide (TRUSOPT) 2 % ophthalmic solution; Place 2 drops into the left eye 3 times daily, Disp-10 mL, R-3Normal    2. Essential hypertension  -     amLODIPine-olmesartan (SHARI) 10-40 MG per tablet; Take 1 tablet by mouth daily, Disp-30 tablet, R-3Normal    The patient was staffed with teaching attending: Dr. Demetrio Chao. An electronic signature was used to authenticate this note. --Gee Virgen MD  Internal Medicine resident, PGY-3    Addendum to Resident H& P/Progress note:  I have personally seen,examined and evaluated the patient.  I have reviewed the current history, physical findings, labs and assessment and plan and agree with note as documented by resident MD ( Dr.St Nehemiah Oden)      Demetrio Chao MD, [address] [place] [address]

## 2023-09-29 NOTE — PATIENT INSTRUCTIONS
Please do daily blood pressure checks at home and record it at least twice a day    At your next clinic visit, please present with your blood pressure machine and recordings for review. We will discuss whether we will add on a medication or keep it the same. We discussed low salt diet and exercise to help with blood pressure  If you have any issues, Please call the clinic! See you in one month!

## 2023-11-16 DIAGNOSIS — E78.00 HYPERCHOLESTEREMIA: ICD-10-CM

## 2023-11-16 RX ORDER — ROSUVASTATIN CALCIUM 20 MG/1
20 TABLET, COATED ORAL DAILY
Qty: 90 TABLET | Refills: 1 | Status: SHIPPED | OUTPATIENT
Start: 2023-11-16

## 2023-11-17 DIAGNOSIS — E78.00 HYPERCHOLESTEREMIA: ICD-10-CM

## 2023-11-25 DIAGNOSIS — I10 ESSENTIAL HYPERTENSION: ICD-10-CM

## 2023-11-27 RX ORDER — AMLODIPINE AND OLMESARTAN MEDOXOMIL 10; 40 MG/1; MG/1
1 TABLET ORAL DAILY
Qty: 90 TABLET | Refills: 1 | Status: SHIPPED | OUTPATIENT
Start: 2023-11-27

## 2023-11-27 NOTE — TELEPHONE ENCOUNTER
Requested Prescriptions     Pending Prescriptions Disp Refills    amLODIPine-olmesartan (SHARI) 10-40 MG per tablet [Pharmacy Med Name: Alexis Wise 10-40 MG] 90 tablet 1     Sig: TAKE 1 TABLET BY [address] [place] [address] Visit:  9/29/2023     Next Clinic Appointment:  Visit date not found

## 2024-01-05 DIAGNOSIS — H40.9 GLAUCOMA OF LEFT EYE, UNSPECIFIED GLAUCOMA TYPE: ICD-10-CM

## 2024-01-05 RX ORDER — TIMOLOL MALEATE 5 MG/ML
1 SOLUTION/ DROPS OPHTHALMIC 2 TIMES DAILY
Qty: 5 ML | Refills: 2 | Status: SHIPPED | OUTPATIENT
Start: 2024-01-05

## 2024-01-05 NOTE — TELEPHONE ENCOUNTER
Requested Prescriptions     Pending Prescriptions Disp Refills    timolol (TIMOPTIC) 0.5 % ophthalmic solution [Pharmacy Med Name: TIMOLOL MALEATE 0.5% EYE DROPS] 5 mL 2     Sig: INSTILL 1 DROP INTO BOTH EYES TWICE A DAY       Last Clinic Visit:  9/29/2023     Next Clinic Appointment:  Visit date not found